# Patient Record
Sex: MALE | Race: WHITE | NOT HISPANIC OR LATINO | URBAN - METROPOLITAN AREA
[De-identification: names, ages, dates, MRNs, and addresses within clinical notes are randomized per-mention and may not be internally consistent; named-entity substitution may affect disease eponyms.]

---

## 2023-10-30 ENCOUNTER — OFFICE VISIT (OUTPATIENT)
Dept: INTERNAL MEDICINE | Facility: CLINIC | Age: 23
End: 2023-10-30
Payer: COMMERCIAL

## 2023-10-30 ENCOUNTER — LAB (OUTPATIENT)
Dept: INTERNAL MEDICINE | Facility: CLINIC | Age: 23
End: 2023-10-30
Payer: COMMERCIAL

## 2023-10-30 VITALS
SYSTOLIC BLOOD PRESSURE: 124 MMHG | BODY MASS INDEX: 24.17 KG/M2 | HEIGHT: 67 IN | TEMPERATURE: 98 F | HEART RATE: 68 BPM | OXYGEN SATURATION: 99 % | WEIGHT: 154 LBS | DIASTOLIC BLOOD PRESSURE: 80 MMHG

## 2023-10-30 DIAGNOSIS — R25.1 EPISODE OF SHAKING: ICD-10-CM

## 2023-10-30 DIAGNOSIS — F84.0 AUTISM: ICD-10-CM

## 2023-10-30 DIAGNOSIS — F41.1 GENERALIZED ANXIETY DISORDER: ICD-10-CM

## 2023-10-30 DIAGNOSIS — Z11.59 ENCOUNTER FOR HEPATITIS C SCREENING TEST FOR LOW RISK PATIENT: Primary | ICD-10-CM

## 2023-10-30 DIAGNOSIS — L98.9 SKIN LESION: Primary | ICD-10-CM

## 2023-10-30 DIAGNOSIS — Z11.59 ENCOUNTER FOR HEPATITIS C SCREENING TEST FOR LOW RISK PATIENT: ICD-10-CM

## 2023-10-30 DIAGNOSIS — F31.9 BIPOLAR 1 DISORDER: ICD-10-CM

## 2023-10-30 NOTE — PROGRESS NOTES
"Subjective   Chief Complaint   Patient presents with    Establish Care    Seizures     Patient says he is having seizures.       Thomas Umanzor is a 23 y.o. male here today as a new patient to establish care.  He is accompanied by his .   He is currently homeless. He has a skin lesion on his left lower extremity.  This has been present for about 6 months.  He would like to see a dermatologist.  His meds are managed by [facility] for autism/bipolar.  He feels that his mood is well controlled.  He has been on these medications since about the age of 15.  Pt feels like he has been having seizures.  He states every few months he has \"shaking episodes\".  He is unsure how long they last.  He feels like his whole body shakes.  He states he \"can feel\" the shakes but no one else seems to notice it.  These episodes started a ear or two ago.   He feels really tired after the shaking occurs, and he has a headache.  Works at [employer] on campus.    Review of Systems   Constitutional:  Negative for activity change, appetite change and fatigue.   HENT:  Negative for congestion.    Respiratory:  Negative for cough and shortness of breath.    Cardiovascular:  Negative for chest pain and leg swelling.   Gastrointestinal:  Negative for abdominal pain.   Neurological:  Positive for headache. Negative for dizziness, weakness and confusion.   Psychiatric/Behavioral:  Positive for agitation and depressed mood. Negative for behavioral problems and decreased concentration. The patient is nervous/anxious.        Past Medical History:   Diagnosis Date    Anemia     Asthma     Autism     Bipolar 1 disorder     Depression     Night terrors      History reviewed. No pertinent surgical history.  Family History   Problem Relation Age of Onset    Diabetes Mother     Obesity Mother     Diabetes Father     Arthritis Maternal Grandmother     Birth defects Maternal Grandfather      Social History     Tobacco Use   Smoking Status Never " "  Smokeless Tobacco Never      Social History     Substance and Sexual Activity   Alcohol Use Never      Current Outpatient Medications on File Prior to Visit   Medication Sig    cloNIDine (CATAPRES) 0.2 MG tablet Take 1 tablet by mouth every night at bedtime.    hydrOXYzine pamoate (VISTARIL) 50 MG capsule TAKE 1 CAPSULE BY MOUTH UP TO TWICE DAILY AS NEEDED    OXcarbazepine (TRILEPTAL) 150 MG tablet TAKE ONE TABLET BY MOUTH IN THE MORNING EVERY MORNING    sertraline (ZOLOFT) 100 MG tablet Take 1 tablet by mouth Daily.    [DISCONTINUED] amoxicillin-clavulanate (AUGMENTIN) 875-125 MG per tablet Take 1 tablet by mouth 2 (Two) Times a Day.    [DISCONTINUED] Lidocaine Viscous HCl (XYLOCAINE) 2 % solution TAKE 5 ML BY MOUTH IF NEEDED FOR MILD PAIN FOR UP TO 10 DAYS.     No current facility-administered medications on file prior to visit.     Allergies   Allergen Reactions    Other Anaphylaxis     He's allergic to carrots    Benadryl [Diphenhydramine] Other (See Comments)     Has the opposite effect on him, causes his hands to sweat, and makes him climb the walls.     Lithium Other (See Comments)     Lithium toxicity       Objective   Vitals:    10/30/23 0854   BP: 124/80   BP Location: Left arm   Patient Position: Sitting   Pulse: 68   Temp: 98 °F (36.7 °C)   SpO2: 99%   Weight: 69.9 kg (154 lb)   Height: 170.2 cm (67\")     Body mass index is 24.12 kg/m².    Physical Exam  Vitals and nursing note reviewed.   HENT:      Head: Normocephalic.   Eyes:      Pupils: Pupils are equal, round, and reactive to light.   Cardiovascular:      Rate and Rhythm: Normal rate and regular rhythm.      Pulses: Normal pulses.      Heart sounds: Normal heart sounds.   Pulmonary:      Effort: Pulmonary effort is normal.      Breath sounds: Normal breath sounds.   Skin:     General: Skin is warm and dry.      Capillary Refill: Capillary refill takes less than 2 seconds.   Neurological:      General: No focal deficit present.      Mental Status: " He is alert and oriented to person, place, and time.      Gait: Gait is intact.   Psychiatric:         Attention and Perception: Attention normal.         Mood and Affect: Mood normal. Affect is flat.         Behavior: Behavior normal.         Assessment & Plan   Problem List Items Addressed This Visit    None  Visit Diagnoses       Skin lesion    -  Primary    Relevant Orders    Ambulatory Referral to Dermatology (Completed)    Episode of shaking        Relevant Orders    EEG    Ambulatory Referral to Neurology    CBC & Differential    Comprehensive Metabolic Panel    TSH Rfx On Abnormal To Free T4    Vitamin B12 & Folate    Vitamin D,25-Hydroxy    Magnesium    Bipolar 1 disorder        Autism        Encounter for hepatitis C screening test for low risk patient        Relevant Orders    Hepatitis C Antibody    Generalized anxiety disorder        Relevant Orders    Vitamin D,25-Hydroxy           Pseudo seizures??  Schedule EEG and refer to neurology  Check labs  Bipolar appears controlled at this time  Would not recommend any med changes at this time due to pt being stable on them  Refer to derm for skin lesion  Cont with [facility]      Current Outpatient Medications:     cloNIDine (CATAPRES) 0.2 MG tablet, Take 1 tablet by mouth every night at bedtime., Disp: , Rfl:     hydrOXYzine pamoate (VISTARIL) 50 MG capsule, TAKE 1 CAPSULE BY MOUTH UP TO TWICE DAILY AS NEEDED, Disp: , Rfl:     OXcarbazepine (TRILEPTAL) 150 MG tablet, TAKE ONE TABLET BY MOUTH IN THE MORNING EVERY MORNING, Disp: , Rfl:     sertraline (ZOLOFT) 100 MG tablet, Take 1 tablet by mouth Daily., Disp: , Rfl:        Plan of care reviewed with the patient at the conclusion of today's visit.  Education was provided regarding diagnosis, management, and any prescribed or recommended OTC medications.  Patient verbalized understanding of and agreement with management plan.     Return in about 2 months (around 12/30/2023) for Anxiety/Depression.        Jose A  Sofi Oneill, APRN

## 2023-12-29 ENCOUNTER — HOSPITAL ENCOUNTER (EMERGENCY)
Facility: HOSPITAL | Age: 23
Discharge: HOME | End: 2023-12-29
Attending: EMERGENCY MEDICINE
Payer: COMMERCIAL

## 2023-12-29 ENCOUNTER — APPOINTMENT (OUTPATIENT)
Dept: GENERAL RADIOLOGY | Facility: HOSPITAL | Age: 23
End: 2023-12-29
Payer: COMMERCIAL

## 2023-12-29 VITALS
TEMPERATURE: 97.9 F | RESPIRATION RATE: 18 BRPM | SYSTOLIC BLOOD PRESSURE: 130 MMHG | DIASTOLIC BLOOD PRESSURE: 82 MMHG | HEART RATE: 75 BPM | BODY MASS INDEX: 24.17 KG/M2 | OXYGEN SATURATION: 99 % | HEIGHT: 67 IN | WEIGHT: 154 LBS

## 2023-12-29 DIAGNOSIS — S62.667A CLOSED NONDISPLACED FRACTURE OF DISTAL PHALANX OF LEFT LITTLE FINGER, INITIAL ENCOUNTER: ICD-10-CM

## 2023-12-29 DIAGNOSIS — Z86.59 HISTORY OF BIPOLAR DISORDER: ICD-10-CM
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
## 2023-12-29 PROCEDURE — 73130 X-RAY EXAM OF HAND: CPT

## 2023-12-29 PROCEDURE — 99283 EMERGENCY DEPT VISIT LOW MDM: CPT

## 2023-12-29 NOTE — Clinical Note
[facility] EMERGENCY DEPARTMENT  [address]  [facility] [address]  Phone: [phone]    Thomas Umanzor was seen and treated in our emergency department on 12/29/2023.  He may return to work on 12/31/2023.         Thank you for choosing [facility].    Nba Frausto RN

## 2023-12-29 NOTE — Clinical Note
[facility] EMERGENCY DEPARTMENT  [address]  [facility] [address]  Phone: [phone]    Thomas Umanzor was seen and treated in our emergency department on 12/29/2023.  He may return to work on 12/31/2023.         Thank you for choosing [facility].    Nba Frausto RN

## 2023-12-30 NOTE — DISCHARGE INSTRUCTIONS
X-rays of the left hand showed a nondisplaced fracture to the distal left fifth finger.  This was not an open fracture.  We applied a splint to help with stabilization and comfort.  Recommend close PCP follow-up for recheck.  Tylenol/ibuprofen every 4-6 hours as needed for pain.  Patient deferred on tetanus status to be updated.  Return to the ER if worsening symptoms.
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
## 2024-01-09 ENCOUNTER — OFFICE VISIT (OUTPATIENT)
Dept: INTERNAL MEDICINE | Facility: CLINIC | Age: 24
End: 2024-01-09
Payer: COMMERCIAL

## 2024-01-09 VITALS
TEMPERATURE: 98 F | HEART RATE: 62 BPM | OXYGEN SATURATION: 100 % | RESPIRATION RATE: 16 BRPM | WEIGHT: 155 LBS | DIASTOLIC BLOOD PRESSURE: 78 MMHG | HEIGHT: 67 IN | SYSTOLIC BLOOD PRESSURE: 122 MMHG | BODY MASS INDEX: 24.33 KG/M2

## 2024-01-09 DIAGNOSIS — F84.0 AUTISM: ICD-10-CM

## 2024-01-09 DIAGNOSIS — F41.1 GENERALIZED ANXIETY DISORDER: ICD-10-CM

## 2024-01-09 DIAGNOSIS — F33.1 MAJOR DEPRESSIVE DISORDER, RECURRENT, MODERATE: Primary | ICD-10-CM

## 2024-01-09 DIAGNOSIS — F31.9 BIPOLAR 1 DISORDER: ICD-10-CM

## 2024-01-09 DIAGNOSIS — S60.112A CONTUSION OF LEFT THUMB NAIL, INITIAL ENCOUNTER: ICD-10-CM

## 2024-01-09 PROCEDURE — 1160F RVW MEDS BY RX/DR IN RCRD: CPT | Performed by: NURSE PRACTITIONER

## 2024-01-09 PROCEDURE — 99214 OFFICE O/P EST MOD 30 MIN: CPT | Performed by: NURSE PRACTITIONER

## 2024-01-09 PROCEDURE — 1159F MED LIST DOCD IN RCRD: CPT | Performed by: NURSE PRACTITIONER

## 2024-01-09 NOTE — PROGRESS NOTES
"Chief Complaint   Patient presents with    Depression     2 mo f/u       HPI  Thomas Umanzor is a 23 y.o. male presents for follow-up on depression and anxiety.  He is currently on Zoloft and Trileptal.  He feels that the meds are working well.  He is being followed by psychiatry.  He had to quit his job and move back home.  Things are going \"OK\".  He hopes the situation will get better.  Recently broke his left pinky.  Has blood under the nail.  States the nail is painful.    The following portions of the patient's history were reviewed and updated as appropriate: allergies, current medications, past family history, past medical history, past social history, past surgical history, and problem list.    Subjective  Review of Systems   Constitutional:  Negative for activity change, appetite change and fatigue.   HENT:  Negative for congestion.    Respiratory:  Negative for cough and shortness of breath.    Cardiovascular:  Negative for chest pain and leg swelling.   Gastrointestinal:  Negative for abdominal pain.   Neurological:  Negative for dizziness, weakness and confusion.   Psychiatric/Behavioral:  Positive for depressed mood. Negative for behavioral problems and decreased concentration.        Objective  Visit Vitals  /78 (BP Location: Left arm, Patient Position: Sitting)   Pulse 62   Temp 98 °F (36.7 °C)   Resp 16   Ht 170.2 cm (67\")   Wt 70.3 kg (155 lb)   SpO2 100%   BMI 24.28 kg/m²        Physical Exam  Vitals and nursing note reviewed.   HENT:      Head: Normocephalic.   Eyes:      Pupils: Pupils are equal, round, and reactive to light.   Pulmonary:      Effort: Pulmonary effort is normal.   Skin:     General: Skin is warm and dry.      Capillary Refill: Capillary refill takes less than 2 seconds.      Comments: Large amount of blood noted under left pinky nail, dark purple, no redness of skin around nail   Neurological:      General: No focal deficit present.      Mental Status: He is alert and " oriented to person, place, and time.      Gait: Gait is intact.   Psychiatric:         Attention and Perception: Attention normal.         Mood and Affect: Mood normal. Affect is flat.         Behavior: Behavior normal.          Procedures     Assessment and Plan  Diagnoses and all orders for this visit:    1. Major depressive disorder, recurrent, moderate (Primary)    2. Bipolar 1 disorder    3. Autism    4. Generalized anxiety disorder    5. Contusion of left thumb nail, initial encounter    Depression & anxiety appears controlled on Zoloft/Trileptal  Cont Hydroxyzine if needed  Appears functional with autism  Cont with psych  F/u with me on a prn basis  Hoping pt's situation improves  Attempted to drill hole in nail with needle to help with pressure under nail, very little blood able to be removed due to pt's pain level    Return if symptoms worsen or fail to improve.        Jose A Oneill, APRN